# Patient Record
Sex: FEMALE | Race: WHITE | Employment: OTHER | URBAN - METROPOLITAN AREA
[De-identification: names, ages, dates, MRNs, and addresses within clinical notes are randomized per-mention and may not be internally consistent; named-entity substitution may affect disease eponyms.]

---

## 2017-02-27 RX ORDER — AMLODIPINE BESYLATE 5 MG/1
TABLET ORAL
Qty: 30 TABLET | Refills: 3 | Status: SHIPPED | OUTPATIENT
Start: 2017-02-27 | End: 2017-10-06 | Stop reason: ALTCHOICE

## 2017-04-06 ENCOUNTER — OFFICE VISIT (OUTPATIENT)
Dept: FAMILY MEDICINE CLINIC | Age: 82
End: 2017-04-06

## 2017-04-06 VITALS
HEART RATE: 68 BPM | BODY MASS INDEX: 27.47 KG/M2 | DIASTOLIC BLOOD PRESSURE: 76 MMHG | TEMPERATURE: 98.2 F | WEIGHT: 175 LBS | SYSTOLIC BLOOD PRESSURE: 134 MMHG | RESPIRATION RATE: 12 BRPM | HEIGHT: 67 IN

## 2017-04-06 DIAGNOSIS — M15.9 PRIMARY OSTEOARTHRITIS INVOLVING MULTIPLE JOINTS: ICD-10-CM

## 2017-04-06 DIAGNOSIS — I65.23 BILATERAL CAROTID ARTERY STENOSIS: ICD-10-CM

## 2017-04-06 DIAGNOSIS — E78.49 OTHER HYPERLIPIDEMIA: Primary | ICD-10-CM

## 2017-04-06 DIAGNOSIS — E78.49 OTHER HYPERLIPIDEMIA: ICD-10-CM

## 2017-04-06 DIAGNOSIS — I10 ESSENTIAL HYPERTENSION: ICD-10-CM

## 2017-04-06 LAB
ALBUMIN SERPL-MCNC: 4.1 G/DL (ref 3.9–4.9)
ALP BLD-CCNC: 63 U/L (ref 40–130)
ALT SERPL-CCNC: 20 U/L (ref 0–33)
ANION GAP SERPL CALCULATED.3IONS-SCNC: 10 MEQ/L (ref 7–13)
AST SERPL-CCNC: 31 U/L (ref 0–35)
BASOPHILS ABSOLUTE: 0 K/UL (ref 0–0.2)
BASOPHILS RELATIVE PERCENT: 0.4 %
BILIRUB SERPL-MCNC: 0.5 MG/DL (ref 0–1.2)
BUN BLDV-MCNC: 18 MG/DL (ref 8–23)
CALCIUM SERPL-MCNC: 9.6 MG/DL (ref 8.6–10.2)
CHLORIDE BLD-SCNC: 93 MEQ/L (ref 98–107)
CHOLESTEROL, TOTAL: 223 MG/DL (ref 0–199)
CO2: 27 MEQ/L (ref 22–29)
CREAT SERPL-MCNC: 1.02 MG/DL (ref 0.5–0.9)
EOSINOPHILS ABSOLUTE: 0.1 K/UL (ref 0–0.7)
EOSINOPHILS RELATIVE PERCENT: 1.4 %
GFR AFRICAN AMERICAN: >60
GFR NON-AFRICAN AMERICAN: 51.4
GLOBULIN: 3.4 G/DL (ref 2.3–3.5)
GLUCOSE BLD-MCNC: 95 MG/DL (ref 74–109)
HCT VFR BLD CALC: 43.9 % (ref 37–47)
HDLC SERPL-MCNC: 58 MG/DL (ref 40–59)
HEMOGLOBIN: 14.8 G/DL (ref 12–16)
LDL CHOLESTEROL CALCULATED: 142 MG/DL (ref 0–129)
LYMPHOCYTES ABSOLUTE: 1.8 K/UL (ref 1–4.8)
LYMPHOCYTES RELATIVE PERCENT: 24.5 %
MCH RBC QN AUTO: 31.7 PG (ref 27–31.3)
MCHC RBC AUTO-ENTMCNC: 33.8 % (ref 33–37)
MCV RBC AUTO: 93.5 FL (ref 82–100)
MONOCYTES ABSOLUTE: 0.7 K/UL (ref 0.2–0.8)
MONOCYTES RELATIVE PERCENT: 9.8 %
NEUTROPHILS ABSOLUTE: 4.8 K/UL (ref 1.4–6.5)
NEUTROPHILS RELATIVE PERCENT: 63.9 %
PDW BLD-RTO: 14.3 % (ref 11.5–14.5)
PLATELET # BLD: 241 K/UL (ref 130–400)
POTASSIUM SERPL-SCNC: 5.1 MEQ/L (ref 3.5–5.1)
RBC # BLD: 4.69 M/UL (ref 4.2–5.4)
SODIUM BLD-SCNC: 130 MEQ/L (ref 132–144)
TOTAL PROTEIN: 7.5 G/DL (ref 6.4–8.1)
TRIGL SERPL-MCNC: 116 MG/DL (ref 0–200)
WBC # BLD: 7.5 K/UL (ref 4.8–10.8)

## 2017-04-06 PROCEDURE — 99214 OFFICE O/P EST MOD 30 MIN: CPT | Performed by: FAMILY MEDICINE

## 2017-04-06 RX ORDER — SULFASALAZINE 500 MG/1
TABLET ORAL
Qty: 90 TABLET | Refills: 3 | Status: SHIPPED | OUTPATIENT
Start: 2017-04-06 | End: 2018-04-06 | Stop reason: SDUPTHER

## 2017-04-06 RX ORDER — LISINOPRIL 10 MG/1
TABLET ORAL
Qty: 90 TABLET | Refills: 3 | Status: CANCELLED | OUTPATIENT
Start: 2017-04-06

## 2017-04-06 ASSESSMENT — ENCOUNTER SYMPTOMS
DIARRHEA: 0
ABDOMINAL PAIN: 0
COUGH: 0
VOMITING: 0
SHORTNESS OF BREATH: 0
CONSTIPATION: 0
NAUSEA: 0
EYES NEGATIVE: 1

## 2017-04-12 ENCOUNTER — HOSPITAL ENCOUNTER (OUTPATIENT)
Dept: ULTRASOUND IMAGING | Age: 82
Discharge: HOME | End: 2017-04-12
Payer: MEDICARE

## 2017-04-12 DIAGNOSIS — I65.23 BILATERAL CAROTID ARTERY STENOSIS: ICD-10-CM

## 2017-04-12 PROCEDURE — 93880 EXTRACRANIAL BILAT STUDY: CPT

## 2017-07-14 ENCOUNTER — OFFICE VISIT (OUTPATIENT)
Dept: CARDIOLOGY | Age: 82
End: 2017-07-14

## 2017-07-14 VITALS
DIASTOLIC BLOOD PRESSURE: 70 MMHG | RESPIRATION RATE: 12 BRPM | HEIGHT: 67 IN | BODY MASS INDEX: 27 KG/M2 | WEIGHT: 172 LBS | HEART RATE: 56 BPM | SYSTOLIC BLOOD PRESSURE: 132 MMHG

## 2017-07-14 DIAGNOSIS — I10 ESSENTIAL HYPERTENSION: Primary | ICD-10-CM

## 2017-07-14 DIAGNOSIS — I65.29 STENOSIS OF CAROTID ARTERY, UNSPECIFIED LATERALITY: ICD-10-CM

## 2017-07-14 PROCEDURE — 99213 OFFICE O/P EST LOW 20 MIN: CPT | Performed by: INTERNAL MEDICINE

## 2017-07-14 ASSESSMENT — ENCOUNTER SYMPTOMS
APNEA: 0
CHEST TIGHTNESS: 0
COLOR CHANGE: 0
COUGH: 0
SHORTNESS OF BREATH: 1

## 2017-08-18 RX ORDER — EZETIMIBE 10 MG/1
TABLET ORAL
Qty: 90 TABLET | Refills: 1 | Status: SHIPPED | OUTPATIENT
Start: 2017-08-18 | End: 2018-02-03 | Stop reason: SDUPTHER

## 2017-10-06 ENCOUNTER — OFFICE VISIT (OUTPATIENT)
Dept: FAMILY MEDICINE CLINIC | Age: 82
End: 2017-10-06

## 2017-10-06 VITALS
BODY MASS INDEX: 26.59 KG/M2 | SYSTOLIC BLOOD PRESSURE: 120 MMHG | WEIGHT: 169.4 LBS | HEART RATE: 68 BPM | DIASTOLIC BLOOD PRESSURE: 82 MMHG | RESPIRATION RATE: 12 BRPM | TEMPERATURE: 94.8 F | HEIGHT: 67 IN

## 2017-10-06 DIAGNOSIS — E78.5 HYPERLIPIDEMIA, UNSPECIFIED HYPERLIPIDEMIA TYPE: ICD-10-CM

## 2017-10-06 DIAGNOSIS — I65.29 STENOSIS OF CAROTID ARTERY, UNSPECIFIED LATERALITY: ICD-10-CM

## 2017-10-06 DIAGNOSIS — Z23 NEED FOR INFLUENZA VACCINATION: ICD-10-CM

## 2017-10-06 DIAGNOSIS — I10 ESSENTIAL HYPERTENSION: Primary | ICD-10-CM

## 2017-10-06 DIAGNOSIS — M15.9 PRIMARY OSTEOARTHRITIS INVOLVING MULTIPLE JOINTS: ICD-10-CM

## 2017-10-06 DIAGNOSIS — I73.00 RAYNAUD'S DISEASE WITHOUT GANGRENE: ICD-10-CM

## 2017-10-06 PROCEDURE — 90662 IIV NO PRSV INCREASED AG IM: CPT | Performed by: FAMILY MEDICINE

## 2017-10-06 PROCEDURE — 99214 OFFICE O/P EST MOD 30 MIN: CPT | Performed by: FAMILY MEDICINE

## 2017-10-06 PROCEDURE — G0008 ADMIN INFLUENZA VIRUS VAC: HCPCS | Performed by: FAMILY MEDICINE

## 2017-10-06 RX ORDER — LISINOPRIL 10 MG/1
TABLET ORAL
Qty: 90 TABLET | Refills: 5 | Status: SHIPPED | OUTPATIENT
Start: 2017-10-06 | End: 2018-07-13 | Stop reason: SDUPTHER

## 2017-10-06 RX ORDER — NIFEDIPINE 30 MG/1
30 TABLET, EXTENDED RELEASE ORAL DAILY
Qty: 30 TABLET | Refills: 3 | Status: SHIPPED | OUTPATIENT
Start: 2017-10-06 | End: 2018-02-03 | Stop reason: SDUPTHER

## 2017-10-06 ASSESSMENT — ENCOUNTER SYMPTOMS
SHORTNESS OF BREATH: 0
DIARRHEA: 0
CONSTIPATION: 0
ABDOMINAL PAIN: 0
COUGH: 0
NAUSEA: 0
EYES NEGATIVE: 1

## 2017-10-06 NOTE — PATIENT INSTRUCTIONS
Stephanie.. [facility]    [address] [facility]  Jeff ALLEN 79 [phone]

## 2017-10-06 NOTE — PROGRESS NOTES
History:   Procedure Laterality Date    HIP FRACTURE SURGERY  2010    RIGHT    PARATHYROID GLAND SURGERY  1994     Social History     Social History    Marital status:      Spouse name: N/A    Number of children: 3    Years of education: N/A     Occupational History     Retired     Social History Main Topics    Smoking status: Never Smoker    Smokeless tobacco: Never Used    Alcohol use No    Drug use: No    Sexual activity: Not on file     Other Topics Concern    Not on file     Social History Narrative     Family History   Problem Relation Age of Onset    Diabetes Mother     Diabetes Sister     Diabetes Brother     Diabetes Maternal Aunt     Heart Attack Father      Allergies   Allergen Reactions    Pcn [Penicillins] Hives    Tape [Adhesive Tape]      SURGICAL TAPE     Current Outpatient Prescriptions   Medication Sig Dispense Refill    lisinopril (PRINIVIL;ZESTRIL) 10 MG tablet TAKE 1 TABLET DAILY 90 tablet 5    NIFEdipine (PROCARDIA XL) 30 MG extended release tablet Take 1 tablet by mouth daily PM 30 tablet 3    ezetimibe (ZETIA) 10 MG tablet TAKE 1 TABLET DAILY 90 tablet 1    metoprolol tartrate (LOPRESSOR) 25 MG tablet Take 1 tablet by mouth 2 times daily 180 tablet 3    sulfaSALAzine (AZULFIDINE) 500 MG tablet TAKE 1 TABLET DAILY 90 tablet 3    calcium carbonate 600 MG TABS tablet Take 1 tablet by mouth 2 times daily.  aspirin 81 MG EC tablet Take 81 mg by mouth daily. No current facility-administered medications for this visit. PMH, Surgical Hx, Family Hx, and Social Hx reviewed and updated. Health Maintenance reviewed. Objective    Vitals:    10/06/17 0758   BP: 120/82   Pulse: 68   Resp: 12   Temp: 94.8 °F (34.9 °C)   TempSrc: Temporal   Weight: 169 lb 6.4 oz (76.8 kg)   Height: 5' 6.5\" (1.689 m)       Physical Exam   Constitutional: She appears well-nourished.    HENT:   Nose: Nose normal.   Mouth/Throat: Oropharynx is clear and moist.   Eyes: file.     Long talk. Health maintenance issues reviewed and discussed with recommendations made. The importance of a good diet and exercise regimen discussed. Take medications as prescribed. We'll start Toprol-XL 1 the weather gets cold at bedtime  When she does that she will cut her beta blocker to just every morning  Follow up as instructed. Call if any problems      Controlled Substances Monitoring:          Soni Alcaraz MD          Please note this report has been partially produced using speech recognition software  And may cause contain errors related to that system including grammar, punctuation and spelling as well as words and phrases that may seem inappropriate. If there are questions or concerns please feel free to contact me to clarify.

## 2017-10-06 NOTE — MR AVS SNAPSHOT
After Visit Summary             Joshua Reese   10/6/2017 8:00 AM   Office Visit    Description:  Female : 5/10/1931   Provider:  Arpit Martinez MD   Department:  [facility]              Your Follow-Up and Future Appointments         Below is a list of your follow-up and future appointments. This may not be a complete list as you may have made appointments directly with providers that we are not aware of or your providers may have made some for you. Please call your providers to confirm appointments. It is important to keep your appointments. Please bring your current insurance card, photo ID, co-pay, and all medication bottles to your appointment. If self-pay, payment is expected at the time of service. Your To-Do List     Future Appointments Provider Department Dept Phone    2018 8:30 AM MD Pedro Luis Jones [phone]    Please arrive 15 minutes prior to appointment, bring photo ID and insurance card. 2018 9:00 AM Kim Uriostegui MD [facility] [phone]    Please arrive 15 minutes prior to appointment time, bring insurance card and photo ID. Information from Your Visit        Department     Name Address Phone Fax    [address] [facility] [address] [address] [phone]      You Were Seen for:         Comments    Essential hypertension   [945055]         Vital Signs     Blood Pressure Pulse Temperature Respirations Height Weight    120/82 68 94.8 °F (34.9 °C) (Temporal) 12 5' 6.5\" (1.689 m) 169 lb 6.4 oz (76.8 kg)    Last Menstrual Period Breastfeeding? Body Mass Index Smoking Status          1984 No 26.93 kg/m2 Never Smoker        Additional Information about your Body Mass Index (BMI)           Your BMI as listed above is considered overweight (25.0-29.9). BMI is an estimate of body fat, calculated from your height and weight.   The higher your BMI, the greater your risk of heart disease, high blood pressure, type 2 diabetes, stroke, gallstones, arthritis, sleep apnea, and certain cancers. BMI is not perfect. It may overestimate body fat in athletes and people who are more muscular. If your body fat is high you can improve your BMI by decreasing your calorie intake and becoming more physically active. Learn more at: Exosect.uk          Instructions    Hypertension / Hyperlipidemia Management    Blood Pressure Log      Tips to manage your condition    1. Keep your blood pressure below 130/80  ? Make sure your blood pressure is measured at every office visit    2. If you take antihypertensive drug therapy return for follow-up monthly until B/P goal is reached. 3. Follow-up with your physician to have your potassium and creatinine measured every 6 months. 4. Measure your blood pressure at home (use log to track your progress). 5. Keep your LDL (bad) cholesterol level below 130  ? Make sure your lipids are measured once a year     6. If you take LDL-lowering drug therapy return for follow-up every 6 months    Tips for healthy living    1. Start your day with breakfast  2. Exercise and slowly progress to (brisk walking, bike riding, etc) 30 minutes 3 to 5 days a week. 3. Snack in moderation (limit eating sugary or salty foods to no more than 3 times a week). 4. Eat more grains and vegetables (have no more than 3 servings of fruit daily). 5. Avoid tobacco use. 6. Drink alcohol in moderation (no more than 1 serving daily for woman and no more than 2 servings daily for men)  7. Obtain annual flu shot  8. Refer to patient education handouts given to you today. [facility] [place] [place] Address Phone Website   [facility]  Dept. [address] [place] [address], [address] [place] [address] [phone] [email or web address] Weight Management Community Resources   Organization Address Phone Website   [facility] [place] [address] [place], [address] [facility] [phone] n/a   Weight Watchers Multiple meeting locations throughout [address] [email or web address]     Tops n/a n/a www. [email or web address]. [address] [facility] [address] [phone] [email or web address]    [facility] [address] [place], [place] [address]gin[facility] [phone] [address]scarginettemaryOhioHealth [facility] 79 [phone]                  Today's Medication Changes          These changes are accurate as of: 10/6/17  8:40 AM.  If you have any questions, ask your nurse or doctor. START taking these medications           NIFEdipine 30 MG extended release tablet   Commonly known as:  PROCARDIA XL   Instructions: Take 1 tablet by mouth daily PM   Quantity:  30 tablet   Refills:  3   Started by:  Tadeo Rudd MD         STOP taking these medications           amLODIPine 5 MG tablet   Commonly known as:  Larraine Ground by:  Tadeo Rudd MD            Where to Get Your Medications      These medications were sent to [address] [facility], [address] [phone] - [phone]  [facility] [address]     Phone:  [phone]     lisinopril 10 MG tablet    NIFEdipine 30 MG extended release tablet               Your Current Medications Are              lisinopril (PRINIVIL;ZESTRIL) 10 MG tablet TAKE 1 TABLET DAILY    NIFEdipine (PROCARDIA XL) 30 MG extended release tablet Take 1 tablet by mouth daily PM    ezetimibe (ZETIA) 10 MG tablet TAKE 1 TABLET DAILY    metoprolol tartrate (LOPRESSOR) 25 MG tablet Take 1 tablet by mouth 2 times daily    sulfaSALAzine (AZULFIDINE) 500 MG tablet TAKE 1 TABLET DAILY    calcium carbonate 600 MG TABS tablet Take 1 tablet by mouth 2 times daily. aspirin 81 MG EC tablet Take 81 mg by mouth daily. Allergies              Pcn [Penicillins] Hives    Tape Loreli Snow Tape]     SURGICAL TAPE      We Ordered/Performed the following           INFLUENZA, HIGH DOSE, 65 YRS +, IM, PF, PREFILL SYR, 0.5ML (FLUZONE HD)          Additional Information        Basic Information     Date Of Birth Sex Race Ethnicity Preferred Language    5/10/1931 Female White Non-/Non  English      Problem List as of 10/6/2017  Date Reviewed: 10/6/2017                Raynaud's disease without gangrene    Essential hypertension    Osteoarthritis    Hyperlipidemia    Carotid stenosis      Immunizations as of 10/6/2017     Name Date    Influenza Virus Vaccine 10/7/2014, 10/15/2013, 9/21/2011    Influenza, High Dose 10/11/2016, 10/12/2015    Pneumococcal 13-valent Conjugate (Zezsdft15) 10/11/2016    Pneumococcal Polysaccharide (Dbmgivxck67) 5/22/2012    Td 5/28/2013    Zoster 9/1/2013      Preventive Care        Date Due    Yearly Flu Vaccine (1) 9/1/2017    Tetanus Combination Vaccine (1 - Tdap) 10/11/2023 (Originally 5/29/2013)            Blanca Arndt 673 allows you to send messages to your doctor, view your test results, renew your prescriptions, schedule appointments, view visit notes, and more. How Do I Sign Up? 1. In your Internet browser, go to [email or web address]  2. Click on the Sign Up Now link in the Sign In box. You will see the New Member Sign Up page. 3. Enter your Wireless Tech Access Code exactly as it appears below. You will not need to use this code after youve completed the sign-up process. If you do not sign up before the expiration date, you must request a new code. Wireless Tech Access Code: [email or web address]  Expires: 12/5/2017  8:39 AM    4. Enter your Social Security Number ([id number]) and Date of Birth (mm/dd/yyyy) as indicated and click Submit. You will be taken to the next sign-up page. 5. Create a CounterTack ID. This will be your CounterTack login ID and cannot be changed, so think of one that is secure and easy to remember. 6. Create a CounterTack password. You can change your password at any time. 7. Enter your Password Reset Question and Answer. This can be used at a later time if you forget your password. 8. Enter your e-mail address. You will receive e-mail notification when new information is available in [address] 9. Click Sign Up. You can now view your medical record. Additional Information  If you have questions, please contact the physician practice where you receive care. Remember, CounterTack is NOT to be used for urgent needs. For medical emergencies, dial 911. For questions regarding your CounterTack account call [phone]. If you have a clinical question, please call your doctor's office.

## 2018-02-03 DIAGNOSIS — I73.00 RAYNAUD'S DISEASE WITHOUT GANGRENE: ICD-10-CM

## 2018-02-05 RX ORDER — NIFEDIPINE 30 MG/1
30 TABLET, EXTENDED RELEASE ORAL DAILY
Qty: 90 TABLET | Refills: 1 | Status: SHIPPED | OUTPATIENT
Start: 2018-02-05 | End: 2018-07-28 | Stop reason: SDUPTHER

## 2018-02-05 RX ORDER — EZETIMIBE 10 MG/1
TABLET ORAL
Qty: 90 TABLET | Refills: 1 | Status: SHIPPED | OUTPATIENT
Start: 2018-02-05 | End: 2018-07-28 | Stop reason: SDUPTHER

## 2018-04-06 ENCOUNTER — OFFICE VISIT (OUTPATIENT)
Dept: FAMILY MEDICINE CLINIC | Age: 83
End: 2018-04-06
Payer: MEDICARE

## 2018-04-06 VITALS
DIASTOLIC BLOOD PRESSURE: 78 MMHG | WEIGHT: 170.6 LBS | HEART RATE: 66 BPM | RESPIRATION RATE: 20 BRPM | TEMPERATURE: 98.2 F | HEIGHT: 67 IN | SYSTOLIC BLOOD PRESSURE: 122 MMHG | BODY MASS INDEX: 26.78 KG/M2

## 2018-04-06 DIAGNOSIS — E78.5 HYPERLIPIDEMIA, UNSPECIFIED HYPERLIPIDEMIA TYPE: Primary | ICD-10-CM

## 2018-04-06 DIAGNOSIS — E78.5 HYPERLIPIDEMIA, UNSPECIFIED HYPERLIPIDEMIA TYPE: ICD-10-CM

## 2018-04-06 DIAGNOSIS — I10 ESSENTIAL HYPERTENSION: ICD-10-CM

## 2018-04-06 DIAGNOSIS — M15.9 PRIMARY OSTEOARTHRITIS INVOLVING MULTIPLE JOINTS: ICD-10-CM

## 2018-04-06 LAB
ALBUMIN SERPL-MCNC: 4.3 G/DL (ref 3.9–4.9)
ALP BLD-CCNC: 61 U/L (ref 40–130)
ALT SERPL-CCNC: 18 U/L (ref 0–33)
ANION GAP SERPL CALCULATED.3IONS-SCNC: 13 MEQ/L (ref 7–13)
AST SERPL-CCNC: 26 U/L (ref 0–35)
BASOPHILS ABSOLUTE: 0 K/UL (ref 0–0.2)
BASOPHILS RELATIVE PERCENT: 0.7 %
BILIRUB SERPL-MCNC: 0.4 MG/DL (ref 0–1.2)
BUN BLDV-MCNC: 15 MG/DL (ref 8–23)
CALCIUM SERPL-MCNC: 9.3 MG/DL (ref 8.6–10.2)
CHLORIDE BLD-SCNC: 98 MEQ/L (ref 98–107)
CHOLESTEROL, TOTAL: 208 MG/DL (ref 0–199)
CO2: 30 MEQ/L (ref 22–29)
CREAT SERPL-MCNC: 0.89 MG/DL (ref 0.5–0.9)
EOSINOPHILS ABSOLUTE: 0.2 K/UL (ref 0–0.7)
EOSINOPHILS RELATIVE PERCENT: 2.3 %
GFR AFRICAN AMERICAN: >60
GFR NON-AFRICAN AMERICAN: >60
GLOBULIN: 3.1 G/DL (ref 2.3–3.5)
GLUCOSE BLD-MCNC: 95 MG/DL (ref 74–109)
HCT VFR BLD CALC: 41.1 % (ref 37–47)
HDLC SERPL-MCNC: 53 MG/DL (ref 40–59)
HEMOGLOBIN: 13.8 G/DL (ref 12–16)
LDL CHOLESTEROL CALCULATED: 128 MG/DL (ref 0–129)
LYMPHOCYTES ABSOLUTE: 2 K/UL (ref 1–4.8)
LYMPHOCYTES RELATIVE PERCENT: 27.7 %
MCH RBC QN AUTO: 32 PG (ref 27–31.3)
MCHC RBC AUTO-ENTMCNC: 33.6 % (ref 33–37)
MCV RBC AUTO: 95.2 FL (ref 82–100)
MONOCYTES ABSOLUTE: 0.7 K/UL (ref 0.2–0.8)
MONOCYTES RELATIVE PERCENT: 9.6 %
NEUTROPHILS ABSOLUTE: 4.2 K/UL (ref 1.4–6.5)
NEUTROPHILS RELATIVE PERCENT: 59.7 %
PDW BLD-RTO: 14.7 % (ref 11.5–14.5)
PLATELET # BLD: 261 K/UL (ref 130–400)
POTASSIUM SERPL-SCNC: 5.2 MEQ/L (ref 3.5–5.1)
RBC # BLD: 4.32 M/UL (ref 4.2–5.4)
SODIUM BLD-SCNC: 141 MEQ/L (ref 132–144)
TOTAL PROTEIN: 7.4 G/DL (ref 6.4–8.1)
TRIGL SERPL-MCNC: 135 MG/DL (ref 0–200)
WBC # BLD: 7.1 K/UL (ref 4.8–10.8)

## 2018-04-06 PROCEDURE — 99214 OFFICE O/P EST MOD 30 MIN: CPT | Performed by: FAMILY MEDICINE

## 2018-04-06 PROCEDURE — 3288F FALL RISK ASSESSMENT DOCD: CPT | Performed by: FAMILY MEDICINE

## 2018-04-06 PROCEDURE — G8510 SCR DEP NEG, NO PLAN REQD: HCPCS | Performed by: FAMILY MEDICINE

## 2018-04-06 RX ORDER — SULFASALAZINE 500 MG/1
TABLET ORAL
Qty: 90 TABLET | Refills: 3 | Status: SHIPPED | OUTPATIENT
Start: 2018-04-06

## 2018-04-06 ASSESSMENT — PATIENT HEALTH QUESTIONNAIRE - PHQ9
SUM OF ALL RESPONSES TO PHQ9 QUESTIONS 1 & 2: 0
1. LITTLE INTEREST OR PLEASURE IN DOING THINGS: 0
2. FEELING DOWN, DEPRESSED OR HOPELESS: 0
SUM OF ALL RESPONSES TO PHQ QUESTIONS 1-9: 0

## 2018-04-06 ASSESSMENT — ENCOUNTER SYMPTOMS
SHORTNESS OF BREATH: 0
ABDOMINAL PAIN: 0
CONSTIPATION: 0
DIARRHEA: 0
COUGH: 0
EYES NEGATIVE: 1
NAUSEA: 0

## 2018-06-01 ENCOUNTER — TELEPHONE (OUTPATIENT)
Dept: FAMILY MEDICINE CLINIC | Age: 83
End: 2018-06-01

## 2018-06-01 DIAGNOSIS — L30.9 ECZEMA, UNSPECIFIED TYPE: Primary | ICD-10-CM

## 2018-06-07 RX ORDER — TRIAMCINOLONE ACETONIDE 1 MG/G
CREAM TOPICAL
Qty: 1 TUBE | Refills: 3 | Status: SHIPPED | OUTPATIENT
Start: 2018-06-07 | End: 2018-10-05 | Stop reason: ALTCHOICE

## 2018-07-13 ENCOUNTER — OFFICE VISIT (OUTPATIENT)
Dept: CARDIOLOGY CLINIC | Age: 83
End: 2018-07-13
Payer: MEDICARE

## 2018-07-13 VITALS
BODY MASS INDEX: 25.9 KG/M2 | HEIGHT: 67 IN | SYSTOLIC BLOOD PRESSURE: 110 MMHG | WEIGHT: 165 LBS | RESPIRATION RATE: 12 BRPM | OXYGEN SATURATION: 97 % | HEART RATE: 60 BPM | DIASTOLIC BLOOD PRESSURE: 80 MMHG

## 2018-07-13 DIAGNOSIS — I65.29 STENOSIS OF CAROTID ARTERY, UNSPECIFIED LATERALITY: Primary | ICD-10-CM

## 2018-07-13 DIAGNOSIS — R06.02 SOB (SHORTNESS OF BREATH) ON EXERTION: ICD-10-CM

## 2018-07-13 DIAGNOSIS — I10 ESSENTIAL HYPERTENSION: ICD-10-CM

## 2018-07-13 PROCEDURE — 99213 OFFICE O/P EST LOW 20 MIN: CPT | Performed by: INTERNAL MEDICINE

## 2018-07-13 RX ORDER — LISINOPRIL 10 MG/1
TABLET ORAL
Qty: 90 TABLET | Refills: 3 | Status: SHIPPED | OUTPATIENT
Start: 2018-07-13 | End: 2020-07-30

## 2018-07-13 ASSESSMENT — ENCOUNTER SYMPTOMS
VOMITING: 0
NAUSEA: 0
ABDOMINAL DISTENTION: 0
ABDOMINAL PAIN: 0
BLOOD IN STOOL: 0
COLOR CHANGE: 0
COUGH: 0
CHEST TIGHTNESS: 0
SHORTNESS OF BREATH: 1
DIARRHEA: 0
APNEA: 0
ANAL BLEEDING: 0

## 2018-07-13 NOTE — PROGRESS NOTES
Raynaud's disease without gangrene       Medications Discontinued During This Encounter   Medication Reason    metoprolol tartrate (LOPRESSOR) 25 MG tablet REORDER    lisinopril (PRINIVIL;ZESTRIL) 10 MG tablet REORDER       Modified Medications    Modified Medication Previous Medication    LISINOPRIL (PRINIVIL;ZESTRIL) 10 MG TABLET lisinopril (PRINIVIL;ZESTRIL) 10 MG tablet       TAKE 1 TABLET DAILY    TAKE 1 TABLET DAILY    METOPROLOL TARTRATE (LOPRESSOR) 25 MG TABLET metoprolol tartrate (LOPRESSOR) 25 MG tablet       1 po DAILY    1 po DAILY       Orders Placed This Encounter   Medications    metoprolol tartrate (LOPRESSOR) 25 MG tablet     Si po DAILY     Dispense:  90 tablet     Refill:  3    lisinopril (PRINIVIL;ZESTRIL) 10 MG tablet     Sig: TAKE 1 TABLET DAILY     Dispense:  90 tablet     Refill:  3           Assessment:    1. Essential hypertension  - lisinopril (PRINIVIL;ZESTRIL) 10 MG tablet; TAKE 1 TABLET DAILY  Dispense: 90 tablet; Refill: 3    2. Stenosis of carotid artery, unspecified laterality    3. SOB (shortness of breath) on exertion       Plan:   Stay on same medications. Patient to see me in 1 year. This note was partially generated using Dragon voice recognition system, and there may be some incorrect words, spellings, punctuation that were not noticed in checking the note before saving.         Electronically signed by Laura Glover MD on 2018 at 9:44 AM

## 2018-07-28 DIAGNOSIS — I73.00 RAYNAUD'S DISEASE WITHOUT GANGRENE: ICD-10-CM

## 2018-07-30 RX ORDER — NIFEDIPINE 30 MG/1
TABLET, EXTENDED RELEASE ORAL
Qty: 90 TABLET | Refills: 1 | Status: SHIPPED | OUTPATIENT
Start: 2018-07-30 | End: 2018-10-05 | Stop reason: SDUPTHER

## 2018-07-30 RX ORDER — EZETIMIBE 10 MG/1
TABLET ORAL
Qty: 90 TABLET | Refills: 1 | Status: SHIPPED | OUTPATIENT
Start: 2018-07-30 | End: 2018-10-05 | Stop reason: SDUPTHER

## 2018-10-05 ENCOUNTER — OFFICE VISIT (OUTPATIENT)
Dept: FAMILY MEDICINE CLINIC | Age: 83
End: 2018-10-05
Payer: MEDICARE

## 2018-10-05 VITALS
HEIGHT: 67 IN | SYSTOLIC BLOOD PRESSURE: 130 MMHG | DIASTOLIC BLOOD PRESSURE: 80 MMHG | TEMPERATURE: 97.4 F | WEIGHT: 163.6 LBS | RESPIRATION RATE: 16 BRPM | BODY MASS INDEX: 25.68 KG/M2 | HEART RATE: 80 BPM

## 2018-10-05 DIAGNOSIS — M15.9 PRIMARY OSTEOARTHRITIS INVOLVING MULTIPLE JOINTS: ICD-10-CM

## 2018-10-05 DIAGNOSIS — I10 ESSENTIAL HYPERTENSION: ICD-10-CM

## 2018-10-05 DIAGNOSIS — R53.83 FATIGUE, UNSPECIFIED TYPE: ICD-10-CM

## 2018-10-05 DIAGNOSIS — R06.09 DYSPNEA ON EXERTION: ICD-10-CM

## 2018-10-05 DIAGNOSIS — I73.00 RAYNAUD'S DISEASE WITHOUT GANGRENE: ICD-10-CM

## 2018-10-05 DIAGNOSIS — E78.5 HYPERLIPIDEMIA, UNSPECIFIED HYPERLIPIDEMIA TYPE: ICD-10-CM

## 2018-10-05 DIAGNOSIS — I10 ESSENTIAL HYPERTENSION: Primary | ICD-10-CM

## 2018-10-05 DIAGNOSIS — Z23 NEED FOR SHINGLES VACCINE: ICD-10-CM

## 2018-10-05 DIAGNOSIS — Z23 NEED FOR INFLUENZA VACCINATION: ICD-10-CM

## 2018-10-05 LAB
ANION GAP SERPL CALCULATED.3IONS-SCNC: 14 MEQ/L (ref 7–13)
BASOPHILS ABSOLUTE: 0 K/UL (ref 0–0.2)
BASOPHILS RELATIVE PERCENT: 0.5 %
BUN BLDV-MCNC: 17 MG/DL (ref 8–23)
CALCIUM SERPL-MCNC: 10 MG/DL (ref 8.6–10.2)
CHLORIDE BLD-SCNC: 96 MEQ/L (ref 98–107)
CO2: 29 MEQ/L (ref 22–29)
CREAT SERPL-MCNC: 0.84 MG/DL (ref 0.5–0.9)
EOSINOPHILS ABSOLUTE: 0.2 K/UL (ref 0–0.7)
EOSINOPHILS RELATIVE PERCENT: 1.9 %
GFR AFRICAN AMERICAN: >60
GFR NON-AFRICAN AMERICAN: >60
GLUCOSE BLD-MCNC: 86 MG/DL (ref 74–109)
HCT VFR BLD CALC: 41.9 % (ref 37–47)
HEMOGLOBIN: 14.2 G/DL (ref 12–16)
LYMPHOCYTES ABSOLUTE: 2.3 K/UL (ref 1–4.8)
LYMPHOCYTES RELATIVE PERCENT: 28.8 %
MCH RBC QN AUTO: 31.9 PG (ref 27–31.3)
MCHC RBC AUTO-ENTMCNC: 34 % (ref 33–37)
MCV RBC AUTO: 94 FL (ref 82–100)
MONOCYTES ABSOLUTE: 0.7 K/UL (ref 0.2–0.8)
MONOCYTES RELATIVE PERCENT: 8.9 %
NEUTROPHILS ABSOLUTE: 4.7 K/UL (ref 1.4–6.5)
NEUTROPHILS RELATIVE PERCENT: 59.9 %
PDW BLD-RTO: 14.8 % (ref 11.5–14.5)
PLATELET # BLD: 250 K/UL (ref 130–400)
POTASSIUM SERPL-SCNC: 5.6 MEQ/L (ref 3.5–5.1)
RBC # BLD: 4.46 M/UL (ref 4.2–5.4)
SODIUM BLD-SCNC: 139 MEQ/L (ref 132–144)
TSH SERPL DL<=0.05 MIU/L-ACNC: 2.32 UIU/ML (ref 0.27–4.2)
WBC # BLD: 7.9 K/UL (ref 4.8–10.8)

## 2018-10-05 PROCEDURE — G0008 ADMIN INFLUENZA VIRUS VAC: HCPCS | Performed by: FAMILY MEDICINE

## 2018-10-05 PROCEDURE — 99214 OFFICE O/P EST MOD 30 MIN: CPT | Performed by: FAMILY MEDICINE

## 2018-10-05 PROCEDURE — 90662 IIV NO PRSV INCREASED AG IM: CPT | Performed by: FAMILY MEDICINE

## 2018-10-05 RX ORDER — NIFEDIPINE 30 MG/1
TABLET, EXTENDED RELEASE ORAL
Qty: 90 TABLET | Refills: 3 | Status: SHIPPED | OUTPATIENT
Start: 2018-10-05

## 2018-10-05 RX ORDER — EZETIMIBE 10 MG/1
TABLET ORAL
Qty: 90 TABLET | Refills: 3 | Status: SHIPPED | OUTPATIENT
Start: 2018-10-05

## 2018-10-05 ASSESSMENT — ENCOUNTER SYMPTOMS
COUGH: 0
DIARRHEA: 0
SHORTNESS OF BREATH: 1
EYES NEGATIVE: 1
ABDOMINAL PAIN: 0
NAUSEA: 0
CONSTIPATION: 0

## 2018-10-05 NOTE — PROGRESS NOTES
file     Social History Narrative    No narrative on file     Family History   Problem Relation Age of Onset    Diabetes Mother     Diabetes Sister     Diabetes Brother     Diabetes Maternal Aunt     Heart Attack Father      Allergies   Allergen Reactions    Pcn [Penicillins] Hives    Tape [Adhesive Tape]      SURGICAL TAPE     Current Outpatient Prescriptions   Medication Sig Dispense Refill    NIFEdipine (PROCARDIA XL) 30 MG extended release tablet TAKE 1 TABLET DAILY IN THE EVENING 90 tablet 3    ezetimibe (ZETIA) 10 MG tablet TAKE 1 TABLET DAILY 90 tablet 3    metoprolol tartrate (LOPRESSOR) 25 MG tablet 1 po DAILY 90 tablet 3    lisinopril (PRINIVIL;ZESTRIL) 10 MG tablet TAKE 1 TABLET DAILY 90 tablet 3    sulfaSALAzine (AZULFIDINE) 500 MG tablet TAKE 1 TABLET DAILY 90 tablet 3    calcium carbonate 600 MG TABS tablet Take 1 tablet by mouth 2 times daily.  aspirin 81 MG EC tablet Take 81 mg by mouth daily. No current facility-administered medications for this visit. PMH, Surgical Hx, Family Hx, and Social Hx reviewed and updated. Health Maintenance reviewed. Objective    Vitals:    10/05/18 0754   BP: 130/80   Pulse: 80   Resp: 16   Temp: 97.4 °F (36.3 °C)   TempSrc: Temporal   Weight: 163 lb 9.6 oz (74.2 kg)   Height: 5' 6.5\" (1.689 m)     Physical Exam   Constitutional: She appears well-nourished. HENT:   Nose: Nose normal.   Mouth/Throat: Oropharynx is clear and moist.   Eyes: Pupils are equal, round, and reactive to light. Conjunctivae are normal.   Neck: Neck supple. No JVD present. Carotid bruit is not present. No thyromegaly present. Cardiovascular: Normal rate, regular rhythm, normal heart sounds and normal pulses. No murmur heard. Pulmonary/Chest: Effort normal and breath sounds normal. She has no wheezes. Abdominal: Soft. Bowel sounds are normal. She exhibits no mass. There is no hepatomegaly. There is no tenderness.    Musculoskeletal: She exhibits no edema.   Mild osteoarthritis changes hands       Lymphadenopathy:     She has no cervical adenopathy. Neurological: She is alert. Coordination normal.   Skin: Skin is warm. No rash noted. No cyanosis. Psychiatric: She has a normal mood and affect. Her speech is normal and behavior is normal.       Assessment & Plan    Diagnosis Orders   1. Essential hypertension  TSH without Reflex    Basic Metabolic Panel    CBC Auto Differential   2. Hyperlipidemia, unspecified hyperlipidemia type     3. Need for influenza vaccination  INFLUENZA, HIGH DOSE, 65 YRS +, IM, PF, PREFILL SYR, 0.5ML (FLUZONE HD)   4. Need for shingles vaccine  zoster recombinant adjuvanted vaccine (SHINGRIX) 50 MCG SUSR injection   5. Raynaud's disease without gangrene  NIFEdipine (PROCARDIA XL) 30 MG extended release tablet   6. Primary osteoarthritis involving multiple joints     7. Dyspnea on exertion  TSH without Reflex    Basic Metabolic Panel    CBC Auto Differential   8.  Fatigue, unspecified type  TSH without Reflex    Basic Metabolic Panel    CBC Auto Differential     Orders Placed This Encounter   Procedures    INFLUENZA, HIGH DOSE, 65 YRS +, IM, PF, PREFILL SYR, 0.5ML (FLUZONE HD)    TSH without Reflex     Standing Status:   Future     Number of Occurrences:   1     Standing Expiration Date:   10/5/2019    Basic Metabolic Panel     Standing Status:   Future     Number of Occurrences:   1     Standing Expiration Date:   10/5/2019    CBC Auto Differential     Standing Status:   Future     Number of Occurrences:   1     Standing Expiration Date:   10/5/2019     Orders Placed This Encounter   Medications    zoster recombinant adjuvanted vaccine (SHINGRIX) 50 MCG SUSR injection     Sig: Inject 0.5 mLs into the muscle once for 1 dose     Dispense:  0.5 mL     Refill:  1    NIFEdipine (PROCARDIA XL) 30 MG extended release tablet     Sig: TAKE 1 TABLET DAILY IN THE EVENING     Dispense:  90 tablet     Refill:  3    ezetimibe (ZETIA) 10 MG

## 2018-10-05 NOTE — PATIENT INSTRUCTIONS
Hypertension / Hyperlipidemia Management    Blood Pressure Log      Tips to manage your condition    1. Keep your blood pressure below 130/80   Make sure your blood pressure is measured at every office visit    2. If you take antihypertensive drug therapy return for follow-up monthly until B/P goal is reached. 3. Follow-up with your physician to have your potassium and creatinine measured every 6 months. 4. Measure your blood pressure at home (use log to track your progress). 5. Keep your LDL (bad) cholesterol level below 130   Make sure your lipids are measured once a year     6. If you take LDL-lowering drug therapy return for follow-up every 6 months    Tips for healthy living    1. Start your day with breakfast  2. Exercise and slowly progress to (brisk walking, bike riding, etc) 30 minutes 3 to 5 days a week. 3. Snack in moderation (limit eating sugary or salty foods to no more than 3 times a week). 4. Eat more grains and vegetables (have no more than 3 servings of fruit daily). 5. Avoid tobacco use. 6. Drink alcohol in moderation (no more than 1 serving daily for woman and no more than 2 servings daily for men)  7. Obtain annual flu shot  8. Refer to patient education handouts given to you today. [facility] Address Phone Website   [facility]  Dept. [address] [place] [address] [phone] [email or web address]       Weight Management Community Resources   Organization Address Phone Website   [facility] ([facility]) [address] [facility], [address] [facility] [phone] n/a   Weight Watchers Multiple meeting locations throughout [address] [place], [place] [address] [email or web address]     Tops n/a n/a www. [email or web address]. [address] [place]  Frank Hernandez, [facility] [address] [phone] [email or web address]    [facility] [address] [place] [address] [facility] [phone]

## 2018-10-29 DIAGNOSIS — E87.5 SERUM POTASSIUM ELEVATED: ICD-10-CM

## 2018-10-29 DIAGNOSIS — E87.5 SERUM POTASSIUM ELEVATED: Primary | ICD-10-CM

## 2018-10-29 LAB
ANION GAP SERPL CALCULATED.3IONS-SCNC: 14 MEQ/L (ref 7–13)
BUN BLDV-MCNC: 19 MG/DL (ref 8–23)
CALCIUM SERPL-MCNC: 9.3 MG/DL (ref 8.6–10.2)
CHLORIDE BLD-SCNC: 94 MEQ/L (ref 98–107)
CO2: 28 MEQ/L (ref 22–29)
CREAT SERPL-MCNC: 1.02 MG/DL (ref 0.5–0.9)
GFR AFRICAN AMERICAN: >60
GFR NON-AFRICAN AMERICAN: 51.2
GLUCOSE BLD-MCNC: 88 MG/DL (ref 74–109)
POTASSIUM SERPL-SCNC: 4.5 MEQ/L (ref 3.5–5.1)
SODIUM BLD-SCNC: 136 MEQ/L (ref 132–144)

## 2019-03-19 ENCOUNTER — TELEPHONE (OUTPATIENT)
Dept: FAMILY MEDICINE CLINIC | Age: 84
End: 2019-03-19

## 2019-03-19 ENCOUNTER — TELEPHONE (OUTPATIENT)
Dept: ADMINISTRATIVE | Age: 84
End: 2019-03-19

## 2019-07-18 ENCOUNTER — OFFICE VISIT (OUTPATIENT)
Dept: CARDIOLOGY CLINIC | Age: 84
End: 2019-07-18
Payer: MEDICARE

## 2019-07-18 VITALS
SYSTOLIC BLOOD PRESSURE: 118 MMHG | WEIGHT: 163 LBS | HEART RATE: 57 BPM | OXYGEN SATURATION: 98 % | DIASTOLIC BLOOD PRESSURE: 80 MMHG | HEIGHT: 67 IN | RESPIRATION RATE: 18 BRPM | BODY MASS INDEX: 25.58 KG/M2

## 2019-07-18 DIAGNOSIS — I65.29 STENOSIS OF CAROTID ARTERY, UNSPECIFIED LATERALITY: ICD-10-CM

## 2019-07-18 DIAGNOSIS — I10 ESSENTIAL HYPERTENSION: Primary | ICD-10-CM

## 2019-07-18 PROCEDURE — 99213 OFFICE O/P EST LOW 20 MIN: CPT | Performed by: INTERNAL MEDICINE

## 2019-07-18 ASSESSMENT — ENCOUNTER SYMPTOMS
VOMITING: 0
COLOR CHANGE: 0
DIARRHEA: 0
CHEST TIGHTNESS: 0
NAUSEA: 0
APNEA: 0
BLOOD IN STOOL: 0
SHORTNESS OF BREATH: 0

## 2020-07-30 RX ORDER — LISINOPRIL 10 MG/1
TABLET ORAL
Qty: 90 TABLET | Refills: 3 | Status: SHIPPED | OUTPATIENT
Start: 2020-07-30

## 2020-07-30 NOTE — TELEPHONE ENCOUNTER
requesting medication refill.  Please approve or deny this request.    Rx requested:  Requested Prescriptions     Pending Prescriptions Disp Refills    lisinopril (PRINIVIL;ZESTRIL) 10 MG tablet [Pharmacy Med Name: LISINOPRIL TAB 10MG] 90 tablet 3     Sig: TAKE 1 TABLET DAILY         Last Office Visit:   7/18/2019      Next Visit Date:  Future Appointments   Date Time Provider Tiffanie Hawkins   8/26/2020 11:15 AM Luis Wray MD [facility] Mark Alvarado

## 2020-08-26 ENCOUNTER — OFFICE VISIT (OUTPATIENT)
Dept: CARDIOLOGY CLINIC | Age: 85
End: 2020-08-26
Payer: MEDICARE

## 2020-08-26 VITALS
SYSTOLIC BLOOD PRESSURE: 122 MMHG | HEART RATE: 62 BPM | DIASTOLIC BLOOD PRESSURE: 84 MMHG | OXYGEN SATURATION: 98 % | BODY MASS INDEX: 24.45 KG/M2 | WEIGHT: 155.8 LBS | RESPIRATION RATE: 20 BRPM | HEIGHT: 67 IN

## 2020-08-26 PROCEDURE — 99213 OFFICE O/P EST LOW 20 MIN: CPT | Performed by: INTERNAL MEDICINE

## 2020-08-26 ASSESSMENT — ENCOUNTER SYMPTOMS
SHORTNESS OF BREATH: 0
NAUSEA: 0
DIARRHEA: 0
VOMITING: 0
CHEST TIGHTNESS: 0
BLOOD IN STOOL: 0
APNEA: 0

## 2020-08-26 NOTE — PROGRESS NOTES
[facility] CARDIOLOGY OFFICE FOLLOW-UP      Patient: Remi Tyler  YOB: 1931  MRN: [id number]    Chief Complaint:  Chief Complaint   Patient presents with    1 Year Follow Up    Hypertension         Subjective/HPI:  8/26/2020: Patient presents today for follow-up of coronary artery disease and hypertension. Dr Stephanie singh. Saw him last year and had labs done everything was okay. In the interim, in the first quarter of this year she had a telemetry medicine with him. She is doing fine. No chest pain congestive heart failure symptoms no syncope no dizziness. She is a vibrant 80years old. Continues to drive. No chest pain no angina. See me in 6 months       7/18/19: Patient presents today for follow-up of coronary artery disease. Hypertension is controlled. Has peripheral vascular disease and status post left carotid endarterectomy. Statin intolerant patient of Dr. Stephanie Le. Last carotid ultrasound was 2017. Bilaterally had less than 50% stenosis. Still drives. See me in 1 year        7/13/18: Patient presents today for Follow-up of coronary artery disease. Has hypertension that's well-controlled. Occasional palpitation. Stress test was positive but most likely artifactual. Because of paucity of symptoms, decided to treat her conservatively. She has dyslipidemia and hypertension which is controlled. See me in one year. Tiffany singh     7/14/17: Patient presents today for follow-up of CAD. I believe she had a false positive stress test with breast attenuation artifact. She has a history of left carotid endarterectomy by Carren Seip. Has history of brain not slowly she take Norvasc in the winter she stopped taking it in May. No angina congestive heart failure symptoms. Has dyslipidemia that stable. See me in one year.     7/8/16: For fu of CP. Had stress test that was false positive. Breast artifact. Had life screen that was negative. Has PAD and LCEA by Carren Seip. Has HLP. Has raynauds. See in 1 year     1/8/16: Has raynauds. Not too symptomatic. Will hold adding any Calcium blocker. See in 6M     12/11/2015: C/O numbness and Raynoauds type symptoms. Get CHIP upper extremity     6/5/15: Had a stress test that was mildly abnormal.Put On lopressor. Angina free. See in [facility]     12/5/14: Doing well. No angina,CHF or syncopy. See in 6M     6/6/14: see in 6 months     5/9/14: Very pleasant Wf,( MD). Has chest pressure and epigastric discomfort. ?GERD.  started prilosec. Stress most likely false+(Big breast,she had breast reduction and 5lbs removed each)Non smoker. Accompanied by daughter. Will add low dose lopressor 25 BID. Add mylanta. See me after echo. She had LCEA by Duglas Anthony. Had parathyroids removed at [facility] 2 decades ago. Doubt angina and agree with Hiti with PPI. Past Medical History:   Diagnosis Date    Carotid stenosis     50 % - LEFT    Hyperlipidemia     Hypertension     Osteoarthritis        Past Surgical History:   Procedure Laterality Date    HIP FRACTURE SURGERY  2010    RIGHT    PARATHYROID GLAND SURGERY  1994       Family History   Problem Relation Age of Onset    Diabetes Mother     Diabetes Sister     Diabetes Brother     Diabetes Maternal Aunt     Heart Attack Father        Social History     Socioeconomic History    Marital status:       Spouse name: None    Number of children: 3    Years of education: None    Highest education level: None   Occupational History     Employer: RETIRED   Social Needs    Financial resource strain: None    Food insecurity     Worry: None     Inability: None    Transportation needs     Medical: None     Non-medical: None   Tobacco Use    Smoking status: Never Smoker    Smokeless tobacco: Never Used   Substance and Sexual Activity    Alcohol use: No     Alcohol/week: 0.0 standard drinks    Drug use: No    Sexual activity: None   Lifestyle    Physical activity     Days per week: None     Minutes per session: None    Stress: None   Relationships  Social connections     Talks on phone: None     Gets together: None     Attends Hinduism service: None     Active member of club or organization: None     Attends meetings of clubs or organizations: None     Relationship status: None    Intimate partner violence     Fear of current or ex partner: None     Emotionally abused: None     Physically abused: None     Forced sexual activity: None   Other Topics Concern    None   Social History Narrative    None       Allergies   Allergen Reactions    Pcn [Penicillins] Hives    Tape [Adhesive Tape]      SURGICAL TAPE       Current Outpatient Medications   Medication Sig Dispense Refill    lisinopril (PRINIVIL;ZESTRIL) 10 MG tablet TAKE 1 TABLET DAILY 90 tablet 3    metoprolol tartrate (LOPRESSOR) 25 MG tablet 1 po DAILY 90 tablet 3    NIFEdipine (PROCARDIA XL) 30 MG extended release tablet TAKE 1 TABLET DAILY IN THE EVENING 90 tablet 3    ezetimibe (ZETIA) 10 MG tablet TAKE 1 TABLET DAILY 90 tablet 3    sulfaSALAzine (AZULFIDINE) 500 MG tablet TAKE 1 TABLET DAILY 90 tablet 3    calcium carbonate 600 MG TABS tablet Take 1 tablet by mouth 2 times daily.  aspirin 81 MG EC tablet Take 81 mg by mouth daily. No current facility-administered medications for this visit. Review of Systems:   Review of Systems   Constitutional: Negative for diaphoresis and fatigue. HENT: Negative for nosebleeds. Respiratory: Negative for apnea, chest tightness and shortness of breath. Cardiovascular: Negative for chest pain, palpitations and leg swelling. Gastrointestinal: Negative for blood in stool, diarrhea, nausea and vomiting. Musculoskeletal: Negative for myalgias, neck pain and neck stiffness. Neurological: Negative for dizziness, seizures, syncope, weakness, light-headedness, numbness and headaches. Hematological: Does not bruise/bleed easily. Psychiatric/Behavioral: Negative for confusion. The patient is not nervous/anxious.     All other systems reviewed and are negative. Review of System is negative except for as mentioned above. Physical Examination:    /84 (Site: Left Upper Arm, Position: Sitting, Cuff Size: Medium Adult)   Pulse 62   Resp 20   Ht 5' 6.5\" (1.689 m)   Wt 155 lb 12.8 oz (70.7 kg)   LMP 01/01/1984   SpO2 98%   BMI 24.77 kg/m²    Physical Exam        Patient Active Problem List   Diagnosis    Hyperlipidemia    Carotid stenosis    Essential hypertension    Osteoarthritis    Raynaud's disease without gangrene           No orders of the defined types were placed in this encounter. No orders of the defined types were placed in this encounter. Assessment:    1. Essential hypertension         Plan:     Stay on same medications. See me in 1 year. This note was partially generated using Dragon voice recognition system, and there may be some incorrect words, spellings, punctuation that were not noticed in checking the note before saving.         Electronically signed by Elmo Duran MD on 8/27/2020 at 5:31 PM